# Patient Record
Sex: FEMALE | Race: WHITE | NOT HISPANIC OR LATINO | Employment: FULL TIME | URBAN - METROPOLITAN AREA
[De-identification: names, ages, dates, MRNs, and addresses within clinical notes are randomized per-mention and may not be internally consistent; named-entity substitution may affect disease eponyms.]

---

## 2024-10-08 ENCOUNTER — OFFICE VISIT (OUTPATIENT)
Dept: URGENT CARE | Facility: CLINIC | Age: 29
End: 2024-10-08
Payer: COMMERCIAL

## 2024-10-08 ENCOUNTER — HOSPITAL ENCOUNTER (OUTPATIENT)
Dept: RADIOLOGY | Facility: CLINIC | Age: 29
Discharge: HOME | End: 2024-10-08
Attending: NURSE PRACTITIONER
Payer: COMMERCIAL

## 2024-10-08 VITALS
WEIGHT: 130 LBS | HEIGHT: 66 IN | TEMPERATURE: 98 F | OXYGEN SATURATION: 100 % | BODY MASS INDEX: 20.89 KG/M2 | RESPIRATION RATE: 18 BRPM | DIASTOLIC BLOOD PRESSURE: 71 MMHG | HEART RATE: 97 BPM | SYSTOLIC BLOOD PRESSURE: 109 MMHG

## 2024-10-08 DIAGNOSIS — M54.9 BACK PAIN, UNSPECIFIED BACK LOCATION, UNSPECIFIED BACK PAIN LATERALITY, UNSPECIFIED CHRONICITY: Primary | ICD-10-CM

## 2024-10-08 DIAGNOSIS — R10.32 LEFT LOWER QUADRANT ABDOMINAL PAIN: ICD-10-CM

## 2024-10-08 DIAGNOSIS — R82.90 ABNORMAL FINDING IN URINE: ICD-10-CM

## 2024-10-08 DIAGNOSIS — N20.0 STONE IN KIDNEY: ICD-10-CM

## 2024-10-08 DIAGNOSIS — M54.9 BACK PAIN, UNSPECIFIED BACK LOCATION, UNSPECIFIED BACK PAIN LATERALITY, UNSPECIFIED CHRONICITY: ICD-10-CM

## 2024-10-08 LAB
B-HCG UR QL: NEGATIVE
BILIRUBIN, UA POC OHS: NEGATIVE
BLOOD, UA POC OHS: NEGATIVE
CLARITY, UA POC OHS: ABNORMAL
COLOR, UA POC OHS: YELLOW
CTP QC/QA: YES
GLUCOSE, UA POC OHS: NEGATIVE
KETONES, UA POC OHS: 40
LEUKOCYTES, UA POC OHS: NEGATIVE
NITRITE, UA POC OHS: NEGATIVE
PH, UA POC OHS: 8.5
PROTEIN, UA POC OHS: NEGATIVE
SPECIFIC GRAVITY, UA POC OHS: 1.01
UROBILINOGEN, UA POC OHS: 1

## 2024-10-08 PROCEDURE — 74018 RADEX ABDOMEN 1 VIEW: CPT | Mod: S$GLB,,, | Performed by: RADIOLOGY

## 2024-10-08 PROCEDURE — 96372 THER/PROPH/DIAG INJ SC/IM: CPT | Mod: S$GLB,,, | Performed by: FAMILY MEDICINE

## 2024-10-08 RX ORDER — KETOROLAC TROMETHAMINE 30 MG/ML
60 INJECTION, SOLUTION INTRAMUSCULAR; INTRAVENOUS
Status: COMPLETED | OUTPATIENT
Start: 2024-10-08 | End: 2024-10-08

## 2024-10-08 RX ORDER — NITROFURANTOIN 25; 75 MG/1; MG/1
100 CAPSULE ORAL 2 TIMES DAILY
Qty: 14 CAPSULE | Refills: 0 | Status: SHIPPED | OUTPATIENT
Start: 2024-10-08 | End: 2024-10-15

## 2024-10-08 RX ORDER — PHENAZOPYRIDINE HYDROCHLORIDE 100 MG/1
100 TABLET, FILM COATED ORAL 3 TIMES DAILY PRN
Qty: 21 TABLET | Refills: 0 | Status: SHIPPED | OUTPATIENT
Start: 2024-10-08 | End: 2024-10-15

## 2024-10-08 RX ADMIN — KETOROLAC TROMETHAMINE 60 MG: 30 INJECTION, SOLUTION INTRAMUSCULAR; INTRAVENOUS at 02:10

## 2024-10-08 NOTE — PATIENT INSTRUCTIONS
What care is needed at home?   Ask your doctor what you need to do when you go home. Make sure you ask questions if you do not understand what the doctor says.  Drink lots of fluids to help pass your kidney stone.  You may be asked to use a filter to strain your urine. The filter catches the stones.  Your doctor may want to send the stones to a lab to test them.  You may need to take medicine to help with the pain as your kidney stone passes.      Please arrange follow up with your primary medical clinic as soon as possible. You must understand that you've received an Urgent Care treatment only and that you may be released before all of your medical problems are known or treated. You, the patient, will arrange for follow up as instructed. If your symptoms worsen or fail to improve you should go to the Emergency Room.         Go to the Emergency Department for any new or worsening symptoms including: worsening abdominal pain, dark\black\bloody bowel movements, vomiting blood, hard abdomen, fever, chest pain, shortness of breath, loss of consciousness or any other concerns.

## 2024-10-08 NOTE — PROGRESS NOTES
"Subjective:      Patient ID: Mercedes Garcia is a 29 y.o. female.    Vitals:  height is 5' 6" (1.676 m) and weight is 59 kg (130 lb). Her oral temperature is 98.1 °F (36.7 °C). Her blood pressure is 109/71 and her pulse is 97. Her respiration is 18 and oxygen saturation is 100%.     Chief Complaint: Abdominal Pain    Pt reports LLQ abdominal pain that radiates around to left flank area. Pt states pain started suddenly today while sitting. Pt reports pain is a sharp sensation.  Pt reports taking ibuprofen at 11 am.     Abdominal Pain  This is a new problem. The current episode started today. The problem occurs intermittently. The problem has been unchanged. The pain is located in the LLQ. The pain is at a severity of 8/10. The pain is severe. The quality of the pain is sharp. The abdominal pain radiates to the left flank. Pertinent negatives include no anorexia, arthralgias, belching, constipation, diarrhea, dysuria, fever, flatus, frequency, headaches, hematochezia, hematuria, melena, myalgias, nausea, vomiting or weight loss. The pain is aggravated by movement. Treatments tried: ibuprofen. The treatment provided moderate relief.       Constitution: Negative for fever.   Gastrointestinal:  Positive for abdominal pain. Negative for nausea, vomiting, constipation, diarrhea and bright red blood in stool.   Genitourinary:  Negative for dysuria, frequency and hematuria.   Musculoskeletal:  Negative for joint pain and muscle ache.   Neurological:  Negative for headaches.      Objective:     Physical Exam   Constitutional: She is oriented to person, place, and time. She appears well-developed. She is cooperative. awake  HENT:   Head: Normocephalic and atraumatic.   Ears:   Right Ear: Hearing, tympanic membrane, external ear and ear canal normal.   Left Ear: Hearing, tympanic membrane, external ear and ear canal normal.   Nose: Nose normal.   Mouth/Throat: Uvula is midline, oropharynx is clear and moist and mucous membranes " are normal.   Eyes: Conjunctivae and lids are normal.   Neck: Trachea normal. Neck supple.   Cardiovascular: Normal rate, regular rhythm and normal heart sounds.   Pulmonary/Chest: Effort normal and breath sounds normal. No respiratory distress.   Abdominal: Normal appearance and bowel sounds are normal. She exhibits no distension and no mass. Soft. flat abdomen There is abdominal tenderness in the left upper quadrant and left lower quadrant. There is guarding and left CVA tenderness.     Musculoskeletal: Normal range of motion.         General: Normal range of motion.        Arms:    Neurological: She is alert and oriented to person, place, and time. She has normal strength.   Skin: Skin is warm, dry, intact, not diaphoretic and not pale.   Psychiatric: Her speech is normal and behavior is normal. Judgment and thought content normal.   Nursing note and vitals reviewed.      Assessment:     1. Back pain, unspecified back location, unspecified back pain laterality, unspecified chronicity    2. Left lower quadrant abdominal pain    3. Stone in kidney        Plan:     Results for orders placed or performed in visit on 10/08/24   POCT Urinalysis(Instrument)    Collection Time: 10/08/24 12:45 PM   Result Value Ref Range    Color, POC UA Yellow Yellow, Straw, Colorless    Clarity, POC UA Cloudy (A) Clear    Glucose, POC UA Negative Negative    Bilirubin, POC UA Negative Negative    Ketones, POC UA 40 (A) Negative    Spec Grav POC UA 1.015 1.005 - 1.030    Blood, POC UA Negative Negative    pH, POC UA 8.5 5.0 - 8.0    Protein, POC UA Negative Negative    Urobilinogen, POC UA 1.0 <=1.0    Nitrite, POC UA Negative Negative    WBC, POC UA Negative Negative   POCT urine pregnancy    Collection Time: 10/08/24 12:48 PM   Result Value Ref Range    POC Preg Test, Ur Negative Negative     Acceptable Yes        Back pain, unspecified back location, unspecified back pain laterality, unspecified chronicity  -     POCT  Urinalysis(Instrument)  -     POCT urine pregnancy  -     XR KUB; Future; Expected date: 10/08/2024  -     US Retroperitoneal Complete; Future; Expected date: 10/08/2024    Left lower quadrant abdominal pain  -     XR KUB; Future; Expected date: 10/08/2024  -     US Retroperitoneal Complete; Future; Expected date: 10/08/2024    Stone in kidney  -     US Retroperitoneal Complete; Future; Expected date: 10/08/2024        Type of Interpretation: Outside Written Report.  Radiology Procedure Done: Abdomen X-Ray - Upright Only.  Interpretation: CLINICAL HISTORY: Left lower quadrant pain     FINDINGS:  Bone structures appear normal for patient age.  No abnormal calcifications are visible.  No bowel distention or free air is identified.  The inferior margin of the right hepatic lobe extends below the level of the right superior iliac crest consistent with either hepatomegaly or a prominent Riedel's lobe.        Impression:     1.  No acute abnormalities are identified.  2.  Inferior margin of the right hepatic lobe extends below the level of the right superior iliac crest, question hepatomegaly versus a prominent Riedel's lobe.     Finalized on: 10/8/2024 1:47 PM By:  Kirk Pulido  [facility]# [id number]      2024-10-08 [id number]    [facility]        Medical Decision Making:   Initial Assessment:   29-year-old female patient presents to clinic after sudden left sided back and abdominal pain started occurring while she was at work.  Patient claims that pain is radiating from mid back area on the left and wraps around going towards the bladder.  Left CVA tenderness.  Urine was abnormal in color and specific gravity PH.  X-ray results only showed incidental findings.  Patient is exam indicates likely kidney stone.  Differential Diagnosis:   Diverticulitis, ovarian cyst, gas pain, gastritis, UTI  Urgent Care Management:  Patient given Toradol shot for pain.  Renal ultrasound scheduled for 1st thing in the morning.

## 2024-10-09 ENCOUNTER — HOSPITAL ENCOUNTER (OUTPATIENT)
Dept: RADIOLOGY | Facility: HOSPITAL | Age: 29
Discharge: HOME | End: 2024-10-09
Attending: NURSE PRACTITIONER
Payer: COMMERCIAL

## 2024-10-09 DIAGNOSIS — N20.0 STONE IN KIDNEY: ICD-10-CM

## 2024-10-09 DIAGNOSIS — M54.9 BACK PAIN, UNSPECIFIED BACK LOCATION, UNSPECIFIED BACK PAIN LATERALITY, UNSPECIFIED CHRONICITY: ICD-10-CM

## 2024-10-09 DIAGNOSIS — R10.32 LEFT LOWER QUADRANT ABDOMINAL PAIN: ICD-10-CM

## 2024-10-09 PROCEDURE — 76770 US EXAM ABDO BACK WALL COMP: CPT | Mod: 26,,, | Performed by: RADIOLOGY

## 2024-10-09 PROCEDURE — 76770 US EXAM ABDO BACK WALL COMP: CPT | Mod: TC,PN
